# Patient Record
Sex: MALE | Race: WHITE | NOT HISPANIC OR LATINO | Employment: OTHER
[De-identification: names, ages, dates, MRNs, and addresses within clinical notes are randomized per-mention and may not be internally consistent; named-entity substitution may affect disease eponyms.]

---

## 2020-02-23 ENCOUNTER — HEALTH MAINTENANCE LETTER (OUTPATIENT)
Age: 70
End: 2020-02-23

## 2020-12-06 ENCOUNTER — HEALTH MAINTENANCE LETTER (OUTPATIENT)
Age: 70
End: 2020-12-06

## 2021-04-11 ENCOUNTER — HEALTH MAINTENANCE LETTER (OUTPATIENT)
Age: 71
End: 2021-04-11

## 2021-09-25 ENCOUNTER — HEALTH MAINTENANCE LETTER (OUTPATIENT)
Age: 71
End: 2021-09-25

## 2021-11-19 ENCOUNTER — MEDICAL CORRESPONDENCE (OUTPATIENT)
Dept: HEALTH INFORMATION MANAGEMENT | Facility: CLINIC | Age: 71
End: 2021-11-19
Payer: COMMERCIAL

## 2021-11-19 ENCOUNTER — TRANSCRIBE ORDERS (OUTPATIENT)
Dept: OTHER | Age: 71
End: 2021-11-19
Payer: COMMERCIAL

## 2021-11-19 DIAGNOSIS — E04.2 MULTINODULAR THYROID: Primary | ICD-10-CM

## 2021-11-22 ENCOUNTER — TELEPHONE (OUTPATIENT)
Dept: OTOLARYNGOLOGY | Facility: CLINIC | Age: 71
End: 2021-11-22
Payer: COMMERCIAL

## 2021-11-22 NOTE — TELEPHONE ENCOUNTER
FUTURE VISIT INFORMATION      FUTURE VISIT INFORMATION:    Date: 12/13/21    Time: 3 PM    Location: [facility]  REFERRAL INFORMATION:    Referring provider: Dr. Sandie Sanchez    Referring providers clinic: [facility]    Reason for visit/diagnosis: Multinodular Thyroid    RECORDS REQUESTED FROM:       Clinic name Comments Records Status Imaging Status   [facility] 11/19/21 - ENDO OV with Dr. Sanchez Care Everywhere    [facility] [place] 4/10/17 - ENDO OV with Dr. Aiken Care Everywhere    [facility] - [facility] 3/27/19 - TSH Care Everywhere    [facility] - [facility] 4/10/17 - US Thyroid/Parathyroid Care Everywhere 11/22 Req - In PACs                       * 11/22/21 9:59 AM Faxed req to [facility] for images to be pushed to [facility]. - Rochelle

## 2021-11-22 NOTE — TELEPHONE ENCOUNTER
LVM to schedule with next available head and neck (Yony Batista, Cristy) or Phillip    Visit type: P NEW    [facility] call center number

## 2021-12-13 ENCOUNTER — OFFICE VISIT (OUTPATIENT)
Dept: OTOLARYNGOLOGY | Facility: CLINIC | Age: 71
End: 2021-12-13
Payer: COMMERCIAL

## 2021-12-13 ENCOUNTER — PRE VISIT (OUTPATIENT)
Dept: OTOLARYNGOLOGY | Facility: CLINIC | Age: 71
End: 2021-12-13

## 2021-12-13 ENCOUNTER — ANCILLARY PROCEDURE (OUTPATIENT)
Dept: CT IMAGING | Facility: CLINIC | Age: 71
End: 2021-12-13
Attending: SURGERY
Payer: COMMERCIAL

## 2021-12-13 ENCOUNTER — PREP FOR PROCEDURE (OUTPATIENT)
Dept: OTOLARYNGOLOGY | Facility: CLINIC | Age: 71
End: 2021-12-13

## 2021-12-13 VITALS
WEIGHT: 275 LBS | HEIGHT: 75 IN | OXYGEN SATURATION: 100 % | DIASTOLIC BLOOD PRESSURE: 70 MMHG | SYSTOLIC BLOOD PRESSURE: 139 MMHG | BODY MASS INDEX: 34.19 KG/M2 | HEART RATE: 59 BPM | TEMPERATURE: 98 F

## 2021-12-13 DIAGNOSIS — E04.9 SUBSTERNAL THYROID GOITER: Primary | ICD-10-CM

## 2021-12-13 DIAGNOSIS — E04.9 SUBSTERNAL THYROID GOITER: ICD-10-CM

## 2021-12-13 PROCEDURE — 99203 OFFICE O/P NEW LOW 30 MIN: CPT | Performed by: SURGERY

## 2021-12-13 PROCEDURE — 70491 CT SOFT TISSUE NECK W/DYE: CPT | Mod: GC | Performed by: RADIOLOGY

## 2021-12-13 RX ORDER — DEXAMETHASONE SODIUM PHOSPHATE 4 MG/ML
10 INJECTION, SOLUTION INTRA-ARTICULAR; INTRALESIONAL; INTRAMUSCULAR; INTRAVENOUS; SOFT TISSUE ONCE
Status: CANCELLED | OUTPATIENT
Start: 2021-12-13 | End: 2021-12-13

## 2021-12-13 RX ORDER — IOPAMIDOL 755 MG/ML
100 INJECTION, SOLUTION INTRAVASCULAR ONCE
Status: COMPLETED | OUTPATIENT
Start: 2021-12-13 | End: 2021-12-13

## 2021-12-13 RX ORDER — PREDNISONE 20 MG/1
TABLET ORAL
COMMUNITY
Start: 2021-08-10

## 2021-12-13 RX ADMIN — IOPAMIDOL 100 ML: 755 INJECTION, SOLUTION INTRAVASCULAR at 18:52

## 2021-12-13 ASSESSMENT — PAIN SCALES - GENERAL: PAINLEVEL: NO PAIN (0)

## 2021-12-13 ASSESSMENT — MIFFLIN-ST. JEOR: SCORE: 2093.02

## 2021-12-13 NOTE — PROGRESS NOTES
Surgery Consult Note  Staff: Dr. Fisher  Date: 12/15/2021   Consulted for: Multinodular goiter    Assessment/Plan:  71 year old male with CML on imatinib who presents for evaluation of known multinodular goiter. He would like surgical removal due to the increasing size.    - CT Chest for surgical planning  - Plan for right thyroid lobectomy, possible total thyroidectomy    Seen and discussed with Dr. Phillip Kwong MD  General Surgery PGY-7    ------------------------------------------  HPI:   Naman Barron is a 71 year old male with CML on imatinib who presents for evaluation of known goiter. He has been monitoring this for at least 4 years. It has slowly been growing. He doesn't notice any symptoms from the goiter, but his wife notes that his voice seems a bit deeper and raspy since the spring. She also states that he is more short of breath when going up stairs in the last month. Denies hyper or hypothyroid symptoms. No prior radiation to the neck/chest and no personal/family history of thyroid cancer. Does not smoke tobacco. Rarely drinks alcohol. No steroid medication or blood thinners. He has tried treating the goiter with levothyroxine, but that did not help.  ?  PMH:  Past Medical History:   Diagnosis Date     Cancer (H) 2007    CML     Sensorineural hearing loss 1971    Mid-range, Hearing Aids 2020     Sleep apnea 2007    CPAP        PSHx:  No prior neck surgery     Medications:  cholecalciferol (VITAMIN D3) 1000 UNIT tablet, Take by mouth daily 4000 units daily  imatinib (GLEEVEC) 400 MG tablet, Take 400 mg by mouth daily  phosphorus tablet 250 mg (PHOSPHORUS TABLET 250 MG) 250 MG per tablet, Phospha 250 Neutral 250 mg tablet  Potassium Phosphate Monobasic (K-PHOS PO), 250 mg 2 times daily  predniSONE (DELTASONE) 20 MG tablet,   vitamin E 400 UNIT capsule, Take by mouth daily 2 tabs daily    Allergies:   Wasps [hornets]     SocHx:  Social History     Socioeconomic History     Marital  "status:      Spouse name: Not on file     Number of children: Not on file     Years of education: Not on file     Highest education level: Not on file   Occupational History     Not on file   Tobacco Use     Smoking status: Never Smoker     Smokeless tobacco: Never Used   Substance and Sexual Activity     Alcohol use: Yes     Comment: Occasional     Drug use: Never     Sexual activity: Yes     Partners: Female     Birth control/protection: Post-menopausal   Other Topics Concern     Not on file   Social History Narrative     Not on file     Social Determinants of Health     Financial Resource Strain: Not on file   Food Insecurity: Not on file   Transportation Needs: Not on file   Physical Activity: Not on file   Stress: Not on file   Social Connections: Not on file   Intimate Partner Violence: Not on file   Housing Stability: Not on file       FamHx:  Negative for bleeding disorders, clotting disorders, or problems with anesthesia    Review of Systems:  ROS: 10 point ROS neg other than the symptoms noted above in the HPI.    Physical Examination   /70   Pulse 59   Temp 98  F (36.7  C)   Ht 1.905 m (6' 3\")   Wt 124.7 kg (275 lb)   SpO2 100%   BMI 34.37 kg/m    General: Awake and alert. NAD  HEENT: visible goiter R>L; larynx deviated to patient's left side; thyroid moves freely with swallowing; no cervical, submental or supra/infra clavicular lymphadenopathy  Pulm: Non labored breathing, no tachypnea   CV: RRR  Abdomen: soft, non-distended  Musculoskel/Extremities: no edema, erythema, tenderness   Skin: no rashes, no diaphoresis and skin color normal     Labs: Reviewed  FNA 12/7/21   A) THYROID, RIGHT LOBE, ULTRASOUND GUIDED FINE NEEDLE ASPIRATION:   1. Cystic thyroid nodule, technically nondiagnostic       B) THYROID, LEFT LOBE, ULTRASOUND GUIDED FINE NEEDLE ASPIRATION:   1. Benign colloid nodule   2. Negative for malignancy        Imaging: Reviewed  U/S thyroid/parathyroid 12/15/21  1.  Multiple " stable bilateral thyroid nodules.   2.  New versus better defined 3.6 cm inferior left thyroid nodule.      Pertinent parts of the the patient's medical history reviewed and confirmed by the provider included : Multinodular goiter    I personally reviewed the EMR, radiographic images, and laboratory data and examined the patient. I agree with the above note    Total time >30 minutes minutes     Sandie Fisher MD  ===================================================    I have reviewed the note as documented above.  This accurately captures the substance of my conversation with the patient,        Assessment/Plan:  Multinodular goiter  Substernal goiter    I recommend total thyroidectomy. This will be a complex case-patient will spend the night in the hospital and have a drain in place.    Reviewed the surgery and procedure including, but not limited to bleeding, infection, injury to the RLN(s), loss of airway, permanent hypocalcemia. Patient would like to proceed with surgery.    Sandie Fisher MD

## 2021-12-13 NOTE — NURSING NOTE
"Chief Complaint   Patient presents with     Consult     multinodal thyroid      Blood pressure 139/70, pulse 59, temperature 98  F (36.7  C), height 1.905 m (6' 3\"), weight 124.7 kg (275 lb), SpO2 100 %.    Henry Mei LPN    "

## 2021-12-13 NOTE — PATIENT INSTRUCTIONS
1. Please schedule CT scan  2. Please call the ENT clinic with any questions,concerns, new or worsening symptoms.    -Clinic number is [phone]   - [facility] direct line (Dr. Fisher's nurse) [phone]

## 2021-12-13 NOTE — LETTER
12/13/2021       RE: Naman Barron  [address] [address] [place] [id number]     Dear Colleague,    Thank you for referring your patient, Naman Barron, to the [facility] [place] [facility]. Please see a copy of my visit note below.    Surgery Consult Note  Staff: Dr. Fisher  Date: 12/15/2021   Consulted for: Multinodular goiter    Assessment/Plan:  71 year old male with CML on imatinib who presents for evaluation of known multinodular goiter. He would like surgical removal due to the increasing size.    - CT Chest for surgical planning  - Plan for right thyroid lobectomy, possible total thyroidectomy    Seen and discussed with Dr. Phillip Kwong MD  General Surgery PGY-7    ------------------------------------------  HPI:   Naman Barron is a 71 year old male with CML on imatinib who presents for evaluation of known goiter. He has been monitoring this for at least 4 years. It has slowly been growing. He doesn't notice any symptoms from the goiter, but his wife notes that his voice seems a bit deeper and raspy since the spring. She also states that he is more short of breath when going up stairs in the last month. Denies hyper or hypothyroid symptoms. No prior radiation to the neck/chest and no personal/family history of thyroid cancer. Does not smoke tobacco. Rarely drinks alcohol. No steroid medication or blood thinners. He has tried treating the goiter with levothyroxine, but that did not help.  ?  PMH:  Past Medical History:   Diagnosis Date     Cancer (H) 2007    CML     Sensorineural hearing loss 1971    Mid-range, Hearing Aids 2020     Sleep apnea 2007    CPAP        PSHx:  No prior neck surgery     Medications:  cholecalciferol (VITAMIN D3) 1000 UNIT tablet, Take by mouth daily 4000 units daily  imatinib (GLEEVEC) 400 MG tablet, Take 400 mg by mouth daily  phosphorus tablet 250 mg (PHOSPHORUS TABLET  "250 MG) 250 MG per tablet, Phospha 250 Neutral 250 mg tablet  Potassium Phosphate Monobasic (K-PHOS PO), 250 mg 2 times daily  predniSONE (DELTASONE) 20 MG tablet,   vitamin E 400 UNIT capsule, Take by mouth daily 2 tabs daily    Allergies:   Wasps [hornets]     SocHx:  Social History     Socioeconomic History     Marital status:      Spouse name: Not on file     Number of children: Not on file     Years of education: Not on file     Highest education level: Not on file   Occupational History     Not on file   Tobacco Use     Smoking status: Never Smoker     Smokeless tobacco: Never Used   Substance and Sexual Activity     Alcohol use: Yes     Comment: Occasional     Drug use: Never     Sexual activity: Yes     Partners: Female     Birth control/protection: Post-menopausal   Other Topics Concern     Not on file   Social History Narrative     Not on file     Social Determinants of Health     Financial Resource Strain: Not on file   Food Insecurity: Not on file   Transportation Needs: Not on file   Physical Activity: Not on file   Stress: Not on file   Social Connections: Not on file   Intimate Partner Violence: Not on file   Housing Stability: Not on file       FamHx:  Negative for bleeding disorders, clotting disorders, or problems with anesthesia    Review of Systems:  ROS: 10 point ROS neg other than the symptoms noted above in the HPI.    Physical Examination   /70   Pulse 59   Temp 98  F (36.7  C)   Ht 1.905 m (6' 3\")   Wt 124.7 kg (275 lb)   SpO2 100%   BMI 34.37 kg/m    General: Awake and alert. NAD  HEENT: visible goiter R>L; larynx deviated to patient's left side; thyroid moves freely with swallowing; no cervical, submental or supra/infra clavicular lymphadenopathy  Pulm: Non labored breathing, no tachypnea   CV: RRR  Abdomen: soft, non-distended  Musculoskel/Extremities: no edema, erythema, tenderness   Skin: no rashes, no diaphoresis and skin color normal     Labs: Reviewed  FNA " 12/7/21   A) THYROID, RIGHT LOBE, ULTRASOUND GUIDED FINE NEEDLE ASPIRATION:   1. Cystic thyroid nodule, technically nondiagnostic       B) THYROID, LEFT LOBE, ULTRASOUND GUIDED FINE NEEDLE ASPIRATION:   1. Benign colloid nodule   2. Negative for malignancy        Imaging: Reviewed  U/S thyroid/parathyroid 12/15/21  1.  Multiple stable bilateral thyroid nodules.   2.  New versus better defined 3.6 cm inferior left thyroid nodule.      Pertinent parts of the the patient's medical history reviewed and confirmed by the provider included : Multinodular goiter    I personally reviewed the EMR, radiographic images, and laboratory data and examined the patient. I agree with the above note    Total time >30 minutes minutes     Sandie Fisher MD  ===================================================    I have reviewed the note as documented above.  This accurately captures the substance of my conversation with the patient,        Assessment/Plan:  Multinodular goiter  Substernal goiter    I recommend total thyroidectomy. This will be a complex case-patient will spend the night in the hospital and have a drain in place.    Reviewed the surgery and procedure including, but not limited to bleeding, infection, injury to the RLN(s), loss of airway, permanent hypocalcemia. Patient would like to proceed with surgery.    Sandie Fisher MD

## 2021-12-27 ENCOUNTER — TELEPHONE (OUTPATIENT)
Dept: OTOLARYNGOLOGY | Facility: CLINIC | Age: 71
End: 2021-12-27
Payer: COMMERCIAL

## 2021-12-27 NOTE — TELEPHONE ENCOUNTER
"Called patient to schedule surgery with Dr. Dutton. Patient asked \"can we postpone this?\" informed patient that writer can call him back at a later date. Pt asked if writer can call next week [place]. Informed patient that writer will give him a call at a later date.     Kathryn Ornelas on 12/27/2021 at 9:57 AM    "

## 2022-01-05 NOTE — TELEPHONE ENCOUNTER
Called patient to schedule surgery with Dr. Fisher. Patient states he will be out of the state in [place] until 3/31/2022 wanting to schedule after that.     Patient scheduled for 4/20/2022. Knows pre-op with PCP within 30 days and covid-19 test within 4 days of surgery.     Location of surgery: [place] OR - patient states he would like this to be scheduled in [place]. Informed patient that Dr. Fisher doesn't do all surgeries in . And this would require at the [facility]     Pre-Op H&P:  [facility]   Post-Op Appt Date:  2 weeks  Imaging needed:  Yes - CT completed  Scheduled/Discussed COVID-19 Testing: Yes    Authorization Completed (Before Scheduling)  No    Patient aware that pre-op RN will call 2-3 days prior to surgery with arrival time and instructions Yes     Packet sent out: Yes 01/05/22    All patients questions were answered and was instructed to review surgical packet and call back with any questions or concerns.     Kathryn Ornelas on 1/5/2022 at 3:37 PM

## 2022-03-09 DIAGNOSIS — Z11.59 ENCOUNTER FOR SCREENING FOR OTHER VIRAL DISEASES: Primary | ICD-10-CM

## 2022-04-19 ENCOUNTER — ANESTHESIA EVENT (OUTPATIENT)
Dept: SURGERY | Facility: CLINIC | Age: 72
DRG: 626 | End: 2022-04-19
Payer: COMMERCIAL

## 2022-04-20 ENCOUNTER — ANESTHESIA (OUTPATIENT)
Dept: SURGERY | Facility: CLINIC | Age: 72
DRG: 626 | End: 2022-04-20
Payer: COMMERCIAL

## 2022-04-20 ENCOUNTER — HOSPITAL ENCOUNTER (INPATIENT)
Facility: CLINIC | Age: 72
LOS: 1 days | Discharge: HOME | DRG: 626 | End: 2022-04-21
Attending: SURGERY | Admitting: SURGERY
Payer: COMMERCIAL

## 2022-04-20 DIAGNOSIS — E89.0 S/P TOTAL THYROIDECTOMY: Primary | ICD-10-CM

## 2022-04-20 LAB
CA-I BLD-MCNC: 4.5 MG/DL (ref 4.4–5.2)
CALCIUM SERPL-MCNC: 8.4 MG/DL (ref 8.5–10.1)
GLUCOSE BLDC GLUCOMTR-MCNC: 113 MG/DL (ref 70–99)
PTH-INTACT SERPL-MCNC: 30 PG/ML (ref 18–80)

## 2022-04-20 PROCEDURE — 370N000017 HC ANESTHESIA TECHNICAL FEE, PER MIN: Performed by: SURGERY

## 2022-04-20 PROCEDURE — 710N000010 HC RECOVERY PHASE 1, LEVEL 2, PER MIN: Performed by: SURGERY

## 2022-04-20 PROCEDURE — 250N000011 HC RX IP 250 OP 636: Performed by: ANESTHESIOLOGY

## 2022-04-20 PROCEDURE — 999N000141 HC STATISTIC PRE-PROCEDURE NURSING ASSESSMENT: Performed by: SURGERY

## 2022-04-20 PROCEDURE — 250N000009 HC RX 250: Performed by: NURSE ANESTHETIST, CERTIFIED REGISTERED

## 2022-04-20 PROCEDURE — 82310 ASSAY OF CALCIUM: CPT

## 2022-04-20 PROCEDURE — 250N000025 HC SEVOFLURANE, PER MIN: Performed by: SURGERY

## 2022-04-20 PROCEDURE — 250N000013 HC RX MED GY IP 250 OP 250 PS 637: Performed by: STUDENT IN AN ORGANIZED HEALTH CARE EDUCATION/TRAINING PROGRAM

## 2022-04-20 PROCEDURE — 5A09357 ASSISTANCE WITH RESPIRATORY VENTILATION, LESS THAN 24 CONSECUTIVE HOURS, CONTINUOUS POSITIVE AIRWAY PRESSURE: ICD-10-PCS | Performed by: SURGERY

## 2022-04-20 PROCEDURE — 258N000003 HC RX IP 258 OP 636: Performed by: SURGERY

## 2022-04-20 PROCEDURE — 360N000077 HC SURGERY LEVEL 4, PER MIN: Performed by: SURGERY

## 2022-04-20 PROCEDURE — 250N000011 HC RX IP 250 OP 636: Performed by: SURGERY

## 2022-04-20 PROCEDURE — 83970 ASSAY OF PARATHORMONE: CPT | Performed by: SURGERY

## 2022-04-20 PROCEDURE — G0378 HOSPITAL OBSERVATION PER HR: HCPCS

## 2022-04-20 PROCEDURE — 36415 COLL VENOUS BLD VENIPUNCTURE: CPT

## 2022-04-20 PROCEDURE — 60271 REMOVAL OF THYROID: CPT | Mod: GC | Performed by: SURGERY

## 2022-04-20 PROCEDURE — 250N000009 HC RX 250: Performed by: SURGERY

## 2022-04-20 PROCEDURE — 88307 TISSUE EXAM BY PATHOLOGIST: CPT | Mod: TC | Performed by: SURGERY

## 2022-04-20 PROCEDURE — 272N000001 HC OR GENERAL SUPPLY STERILE: Performed by: SURGERY

## 2022-04-20 PROCEDURE — 258N000003 HC RX IP 258 OP 636: Performed by: NURSE ANESTHETIST, CERTIFIED REGISTERED

## 2022-04-20 PROCEDURE — 120N000002 HC R&B MED SURG/OB UMMC

## 2022-04-20 PROCEDURE — 250N000011 HC RX IP 250 OP 636: Performed by: NURSE ANESTHETIST, CERTIFIED REGISTERED

## 2022-04-20 PROCEDURE — 0GTK0ZZ RESECTION OF THYROID GLAND, OPEN APPROACH: ICD-10-PCS | Performed by: SURGERY

## 2022-04-20 PROCEDURE — 82330 ASSAY OF CALCIUM: CPT

## 2022-04-20 PROCEDURE — 36415 COLL VENOUS BLD VENIPUNCTURE: CPT | Performed by: SURGERY

## 2022-04-20 RX ORDER — HYDROMORPHONE HCL IN WATER/PF 6 MG/30 ML
0.4 PATIENT CONTROLLED ANALGESIA SYRINGE INTRAVENOUS
Status: DISCONTINUED | OUTPATIENT
Start: 2022-04-20 | End: 2022-04-21 | Stop reason: HOSPADM

## 2022-04-20 RX ORDER — NALOXONE HYDROCHLORIDE 0.4 MG/ML
0.2 INJECTION, SOLUTION INTRAMUSCULAR; INTRAVENOUS; SUBCUTANEOUS
Status: DISCONTINUED | OUTPATIENT
Start: 2022-04-20 | End: 2022-04-21 | Stop reason: HOSPADM

## 2022-04-20 RX ORDER — LIDOCAINE 40 MG/G
CREAM TOPICAL
Status: DISCONTINUED | OUTPATIENT
Start: 2022-04-20 | End: 2022-04-20 | Stop reason: HOSPADM

## 2022-04-20 RX ORDER — LIDOCAINE HYDROCHLORIDE 20 MG/ML
INJECTION, SOLUTION INFILTRATION; PERINEURAL PRN
Status: DISCONTINUED | OUTPATIENT
Start: 2022-04-20 | End: 2022-04-20

## 2022-04-20 RX ORDER — NALOXONE HYDROCHLORIDE 0.4 MG/ML
0.2 INJECTION, SOLUTION INTRAMUSCULAR; INTRAVENOUS; SUBCUTANEOUS
Status: DISCONTINUED | OUTPATIENT
Start: 2022-04-20 | End: 2022-04-20 | Stop reason: HOSPADM

## 2022-04-20 RX ORDER — SODIUM CHLORIDE, SODIUM LACTATE, POTASSIUM CHLORIDE, CALCIUM CHLORIDE 600; 310; 30; 20 MG/100ML; MG/100ML; MG/100ML; MG/100ML
INJECTION, SOLUTION INTRAVENOUS CONTINUOUS
Status: DISCONTINUED | OUTPATIENT
Start: 2022-04-20 | End: 2022-04-20 | Stop reason: HOSPADM

## 2022-04-20 RX ORDER — GLYCOPYRROLATE 0.2 MG/ML
INJECTION, SOLUTION INTRAMUSCULAR; INTRAVENOUS PRN
Status: DISCONTINUED | OUTPATIENT
Start: 2022-04-20 | End: 2022-04-20

## 2022-04-20 RX ORDER — HYDROMORPHONE HYDROCHLORIDE 1 MG/ML
0.2 INJECTION, SOLUTION INTRAMUSCULAR; INTRAVENOUS; SUBCUTANEOUS EVERY 5 MIN PRN
Status: DISCONTINUED | OUTPATIENT
Start: 2022-04-20 | End: 2022-04-20 | Stop reason: HOSPADM

## 2022-04-20 RX ORDER — LIDOCAINE 40 MG/G
CREAM TOPICAL
Status: DISCONTINUED | OUTPATIENT
Start: 2022-04-20 | End: 2022-04-21 | Stop reason: HOSPADM

## 2022-04-20 RX ORDER — EPHEDRINE SULFATE 50 MG/ML
INJECTION, SOLUTION INTRAMUSCULAR; INTRAVENOUS; SUBCUTANEOUS PRN
Status: DISCONTINUED | OUTPATIENT
Start: 2022-04-20 | End: 2022-04-20

## 2022-04-20 RX ORDER — OXYCODONE HYDROCHLORIDE 10 MG/1
10 TABLET ORAL EVERY 4 HOURS PRN
Status: DISCONTINUED | OUTPATIENT
Start: 2022-04-20 | End: 2022-04-21 | Stop reason: HOSPADM

## 2022-04-20 RX ORDER — DEXAMETHASONE SODIUM PHOSPHATE 10 MG/ML
10 INJECTION, SOLUTION INTRAMUSCULAR; INTRAVENOUS ONCE
Status: COMPLETED | OUTPATIENT
Start: 2022-04-20 | End: 2022-04-20

## 2022-04-20 RX ORDER — NALOXONE HYDROCHLORIDE 0.4 MG/ML
0.4 INJECTION, SOLUTION INTRAMUSCULAR; INTRAVENOUS; SUBCUTANEOUS
Status: DISCONTINUED | OUTPATIENT
Start: 2022-04-20 | End: 2022-04-20 | Stop reason: HOSPADM

## 2022-04-20 RX ORDER — MEPERIDINE HYDROCHLORIDE 25 MG/ML
12.5 INJECTION INTRAMUSCULAR; INTRAVENOUS; SUBCUTANEOUS
Status: DISCONTINUED | OUTPATIENT
Start: 2022-04-20 | End: 2022-04-20 | Stop reason: HOSPADM

## 2022-04-20 RX ORDER — NALOXONE HYDROCHLORIDE 0.4 MG/ML
0.4 INJECTION, SOLUTION INTRAMUSCULAR; INTRAVENOUS; SUBCUTANEOUS
Status: DISCONTINUED | OUTPATIENT
Start: 2022-04-20 | End: 2022-04-21 | Stop reason: HOSPADM

## 2022-04-20 RX ORDER — PROPOFOL 10 MG/ML
INJECTION, EMULSION INTRAVENOUS PRN
Status: DISCONTINUED | OUTPATIENT
Start: 2022-04-20 | End: 2022-04-20

## 2022-04-20 RX ORDER — ACETAMINOPHEN 325 MG/1
975 TABLET ORAL 3 TIMES DAILY
Status: DISCONTINUED | OUTPATIENT
Start: 2022-04-20 | End: 2022-04-21 | Stop reason: HOSPADM

## 2022-04-20 RX ORDER — IMATINIB MESYLATE 400 MG/1
400 TABLET, FILM COATED ORAL
Status: DISCONTINUED | OUTPATIENT
Start: 2022-04-21 | End: 2022-04-21 | Stop reason: HOSPADM

## 2022-04-20 RX ORDER — FENTANYL CITRATE 50 UG/ML
INJECTION, SOLUTION INTRAMUSCULAR; INTRAVENOUS PRN
Status: DISCONTINUED | OUTPATIENT
Start: 2022-04-20 | End: 2022-04-20

## 2022-04-20 RX ORDER — PROCHLORPERAZINE MALEATE 5 MG
5 TABLET ORAL EVERY 6 HOURS PRN
Status: DISCONTINUED | OUTPATIENT
Start: 2022-04-20 | End: 2022-04-21 | Stop reason: HOSPADM

## 2022-04-20 RX ORDER — ONDANSETRON 2 MG/ML
4 INJECTION INTRAMUSCULAR; INTRAVENOUS EVERY 6 HOURS PRN
Status: DISCONTINUED | OUTPATIENT
Start: 2022-04-20 | End: 2022-04-21 | Stop reason: HOSPADM

## 2022-04-20 RX ORDER — OXYCODONE HYDROCHLORIDE 5 MG/1
5 TABLET ORAL EVERY 4 HOURS PRN
Status: DISCONTINUED | OUTPATIENT
Start: 2022-04-20 | End: 2022-04-20 | Stop reason: HOSPADM

## 2022-04-20 RX ORDER — CALCIUM CARBONATE 500 MG/1
1000 TABLET, CHEWABLE ORAL 3 TIMES DAILY
Status: DISCONTINUED | OUTPATIENT
Start: 2022-04-20 | End: 2022-04-21 | Stop reason: HOSPADM

## 2022-04-20 RX ORDER — HYDROMORPHONE HCL IN WATER/PF 6 MG/30 ML
0.2 PATIENT CONTROLLED ANALGESIA SYRINGE INTRAVENOUS
Status: DISCONTINUED | OUTPATIENT
Start: 2022-04-20 | End: 2022-04-21 | Stop reason: HOSPADM

## 2022-04-20 RX ORDER — IMATINIB MESYLATE 400 MG/1
400 TABLET, FILM COATED ORAL DAILY
Status: DISCONTINUED | OUTPATIENT
Start: 2022-04-21 | End: 2022-04-20

## 2022-04-20 RX ORDER — CALCITRIOL 0.25 UG/1
0.25 CAPSULE, LIQUID FILLED ORAL 3 TIMES DAILY
Status: DISCONTINUED | OUTPATIENT
Start: 2022-04-20 | End: 2022-04-21 | Stop reason: HOSPADM

## 2022-04-20 RX ORDER — FENTANYL CITRATE 50 UG/ML
25 INJECTION, SOLUTION INTRAMUSCULAR; INTRAVENOUS
Status: DISCONTINUED | OUTPATIENT
Start: 2022-04-20 | End: 2022-04-20 | Stop reason: HOSPADM

## 2022-04-20 RX ORDER — ONDANSETRON 2 MG/ML
4 INJECTION INTRAMUSCULAR; INTRAVENOUS EVERY 30 MIN PRN
Status: DISCONTINUED | OUTPATIENT
Start: 2022-04-20 | End: 2022-04-20 | Stop reason: HOSPADM

## 2022-04-20 RX ORDER — OXYCODONE HYDROCHLORIDE 5 MG/1
5 TABLET ORAL EVERY 4 HOURS PRN
Status: DISCONTINUED | OUTPATIENT
Start: 2022-04-20 | End: 2022-04-21 | Stop reason: HOSPADM

## 2022-04-20 RX ORDER — ONDANSETRON 4 MG/1
4 TABLET, ORALLY DISINTEGRATING ORAL EVERY 6 HOURS PRN
Status: DISCONTINUED | OUTPATIENT
Start: 2022-04-20 | End: 2022-04-21 | Stop reason: HOSPADM

## 2022-04-20 RX ORDER — FENTANYL CITRATE 50 UG/ML
25 INJECTION, SOLUTION INTRAMUSCULAR; INTRAVENOUS EVERY 5 MIN PRN
Status: DISCONTINUED | OUTPATIENT
Start: 2022-04-20 | End: 2022-04-20 | Stop reason: HOSPADM

## 2022-04-20 RX ORDER — ONDANSETRON 4 MG/1
4 TABLET, ORALLY DISINTEGRATING ORAL EVERY 30 MIN PRN
Status: DISCONTINUED | OUTPATIENT
Start: 2022-04-20 | End: 2022-04-20 | Stop reason: HOSPADM

## 2022-04-20 RX ORDER — ONDANSETRON 2 MG/ML
INJECTION INTRAMUSCULAR; INTRAVENOUS PRN
Status: DISCONTINUED | OUTPATIENT
Start: 2022-04-20 | End: 2022-04-20

## 2022-04-20 RX ORDER — LEVOTHYROXINE SODIUM 150 UG/1
150 TABLET ORAL DAILY
Status: DISCONTINUED | OUTPATIENT
Start: 2022-04-20 | End: 2022-04-21 | Stop reason: HOSPADM

## 2022-04-20 RX ORDER — PHENYLEPHRINE HCL IN 0.9% NACL 50MG/250ML
.1-1 PLASTIC BAG, INJECTION (ML) INTRAVENOUS CONTINUOUS
Status: DISCONTINUED | OUTPATIENT
Start: 2022-04-20 | End: 2022-04-20 | Stop reason: HOSPADM

## 2022-04-20 RX ORDER — SODIUM CHLORIDE, SODIUM LACTATE, POTASSIUM CHLORIDE, CALCIUM CHLORIDE 600; 310; 30; 20 MG/100ML; MG/100ML; MG/100ML; MG/100ML
INJECTION, SOLUTION INTRAVENOUS CONTINUOUS PRN
Status: DISCONTINUED | OUTPATIENT
Start: 2022-04-20 | End: 2022-04-20

## 2022-04-20 RX ADMIN — PHENYLEPHRINE HYDROCHLORIDE 100 MCG: 10 INJECTION INTRAVENOUS at 09:16

## 2022-04-20 RX ADMIN — Medication 0.5 MCG/KG/MIN: at 10:23

## 2022-04-20 RX ADMIN — CALCITRIOL CAPSULES 0.25 MCG 0.25 MCG: 0.25 CAPSULE ORAL at 23:14

## 2022-04-20 RX ADMIN — FENTANYL CITRATE 25 MCG: 50 INJECTION, SOLUTION INTRAMUSCULAR; INTRAVENOUS at 12:50

## 2022-04-20 RX ADMIN — Medication 5 MG: at 08:33

## 2022-04-20 RX ADMIN — LIDOCAINE HYDROCHLORIDE 60 MG: 20 INJECTION, SOLUTION INFILTRATION; PERINEURAL at 08:15

## 2022-04-20 RX ADMIN — PHENYLEPHRINE HYDROCHLORIDE 200 MCG: 10 INJECTION INTRAVENOUS at 10:05

## 2022-04-20 RX ADMIN — ANTACID TABLETS 1000 MG: 500 TABLET, CHEWABLE ORAL at 23:14

## 2022-04-20 RX ADMIN — REMIFENTANIL HYDROCHLORIDE 0.1 MCG/KG/MIN: 1 INJECTION, POWDER, LYOPHILIZED, FOR SOLUTION INTRAVENOUS at 10:41

## 2022-04-20 RX ADMIN — PHENYLEPHRINE HYDROCHLORIDE 100 MCG: 10 INJECTION INTRAVENOUS at 11:44

## 2022-04-20 RX ADMIN — HYDROMORPHONE HYDROCHLORIDE 0.2 MG: 1 INJECTION, SOLUTION INTRAMUSCULAR; INTRAVENOUS; SUBCUTANEOUS at 13:49

## 2022-04-20 RX ADMIN — GLYCOPYRROLATE 0.2 MG: 0.2 INJECTION, SOLUTION INTRAMUSCULAR; INTRAVENOUS at 08:35

## 2022-04-20 RX ADMIN — ONDANSETRON 4 MG: 2 INJECTION INTRAMUSCULAR; INTRAVENOUS at 11:54

## 2022-04-20 RX ADMIN — Medication 5 MG: at 08:31

## 2022-04-20 RX ADMIN — LIDOCAINE HYDROCHLORIDE 60 MG: 20 INJECTION, SOLUTION INFILTRATION; PERINEURAL at 12:13

## 2022-04-20 RX ADMIN — REMIFENTANIL HYDROCHLORIDE 0.05 MCG/KG/MIN: 1 INJECTION, POWDER, LYOPHILIZED, FOR SOLUTION INTRAVENOUS at 08:16

## 2022-04-20 RX ADMIN — PHENYLEPHRINE HYDROCHLORIDE 100 MCG: 10 INJECTION INTRAVENOUS at 10:17

## 2022-04-20 RX ADMIN — PHENYLEPHRINE HYDROCHLORIDE 100 MCG: 10 INJECTION INTRAVENOUS at 08:55

## 2022-04-20 RX ADMIN — HYDROMORPHONE HYDROCHLORIDE 0.3 MG: 1 INJECTION, SOLUTION INTRAMUSCULAR; INTRAVENOUS; SUBCUTANEOUS at 11:44

## 2022-04-20 RX ADMIN — SODIUM CHLORIDE, POTASSIUM CHLORIDE, SODIUM LACTATE AND CALCIUM CHLORIDE: 600; 310; 30; 20 INJECTION, SOLUTION INTRAVENOUS at 08:00

## 2022-04-20 RX ADMIN — PROPOFOL 180 MG: 10 INJECTION, EMULSION INTRAVENOUS at 08:16

## 2022-04-20 RX ADMIN — Medication 140 MG: at 08:16

## 2022-04-20 RX ADMIN — PHENYLEPHRINE HYDROCHLORIDE 200 MCG: 10 INJECTION INTRAVENOUS at 09:47

## 2022-04-20 RX ADMIN — FENTANYL CITRATE 25 MCG: 50 INJECTION, SOLUTION INTRAMUSCULAR; INTRAVENOUS at 13:08

## 2022-04-20 RX ADMIN — FENTANYL CITRATE 25 MCG: 50 INJECTION, SOLUTION INTRAMUSCULAR; INTRAVENOUS at 13:40

## 2022-04-20 RX ADMIN — ACETAMINOPHEN 975 MG: 325 TABLET ORAL at 14:07

## 2022-04-20 RX ADMIN — FENTANYL CITRATE 50 MCG: 50 INJECTION, SOLUTION INTRAMUSCULAR; INTRAVENOUS at 11:54

## 2022-04-20 RX ADMIN — FENTANYL CITRATE 25 MCG: 50 INJECTION, SOLUTION INTRAMUSCULAR; INTRAVENOUS at 13:35

## 2022-04-20 RX ADMIN — PHENYLEPHRINE HYDROCHLORIDE 200 MCG: 10 INJECTION INTRAVENOUS at 09:36

## 2022-04-20 RX ADMIN — LEVOTHYROXINE SODIUM 150 MCG: 0.15 TABLET ORAL at 18:27

## 2022-04-20 RX ADMIN — PHENYLEPHRINE HYDROCHLORIDE 100 MCG: 10 INJECTION INTRAVENOUS at 08:51

## 2022-04-20 RX ADMIN — HYDROMORPHONE HYDROCHLORIDE 0.2 MG: 1 INJECTION, SOLUTION INTRAMUSCULAR; INTRAVENOUS; SUBCUTANEOUS at 12:13

## 2022-04-20 RX ADMIN — FENTANYL CITRATE 50 MCG: 50 INJECTION, SOLUTION INTRAMUSCULAR; INTRAVENOUS at 08:15

## 2022-04-20 RX ADMIN — PHENYLEPHRINE HYDROCHLORIDE 200 MCG: 10 INJECTION INTRAVENOUS at 09:06

## 2022-04-20 RX ADMIN — ACETAMINOPHEN 975 MG: 325 TABLET ORAL at 19:36

## 2022-04-20 RX ADMIN — Medication 5 MG: at 08:35

## 2022-04-20 RX ADMIN — PROPOFOL 20 MG: 10 INJECTION, EMULSION INTRAVENOUS at 08:24

## 2022-04-20 RX ADMIN — PHENYLEPHRINE HYDROCHLORIDE 100 MCG: 10 INJECTION INTRAVENOUS at 09:23

## 2022-04-20 RX ADMIN — DEXAMETHASONE SODIUM PHOSPHATE 10 MG: 10 INJECTION, SOLUTION INTRAMUSCULAR; INTRAVENOUS at 08:16

## 2022-04-20 RX ADMIN — CALCITRIOL CAPSULES 0.25 MCG 0.25 MCG: 0.25 CAPSULE ORAL at 18:24

## 2022-04-20 ASSESSMENT — ACTIVITIES OF DAILY LIVING (ADL)
ADLS_ACUITY_SCORE: 5
ADLS_ACUITY_SCORE: 6
ADLS_ACUITY_SCORE: 5
ADLS_ACUITY_SCORE: 4
ADLS_ACUITY_SCORE: 5

## 2022-04-20 NOTE — ANESTHESIA PREPROCEDURE EVALUATION
Anesthesia Pre-Procedure Evaluation    Patient: Naman Barron   MRN: [id number] : 1950        Procedure : Procedure(s):  substernal thyroidectomy           Past Medical History:   Diagnosis Date     Cancer (H), chronic     CML     Gastroesophageal reflux disease      Gout      Obese      Sensorineural hearing loss 1971    Mid-range, Hearing Aids      Sleep apnea 2007    CPAP      Past Surgical History:   Procedure Laterality Date     COLONOSCOPY        Allergies   Allergen Reactions     Wasps [Hornets] Difficulty breathing     Throat swells up      Social History     Tobacco Use     Smoking status: Never Smoker     Smokeless tobacco: Never Used   Substance Use Topics     Alcohol use: Yes     Comment: Occasional      Wt Readings from Last 1 Encounters:   22 115.7 kg (255 lb 1.2 oz)        Anesthesia Evaluation            ROS/MED HX  ENT/Pulmonary:     (+) sleep apnea, moderate, uses CPAP,     Neurologic: Comment: sensori neural hearing loss; hearing aids      Cardiovascular: Comment: normal sinus rhythm    Normal stress echo      METS/Exercise Tolerance: >4 METS    Hematologic: Comments: History of CML      Musculoskeletal:       GI/Hepatic:     (+) GERD, Asymptomatic on medication,     Renal/Genitourinary:       Endo:     (+) Obesity,     Psychiatric/Substance Use:       Infectious Disease:       Malignancy:       Other:            Physical Exam    Airway      Comment: Right thyroid lobe enlarged; neck mass deviating trachea    Mallampati: I   TM distance: > 3 FB   Neck ROM: full   Mouth opening: > 3 cm    Respiratory Devices and Support         Dental  no notable dental history         Cardiovascular   cardiovascular exam normal       Rhythm and rate: regular     Pulmonary   pulmonary exam normal                OUTSIDE LABS:  CBC: No results found for: WBC, HGB, HCT, PLT  BMP:   Lab Results   Component Value Date     (H) 2022     COAGS: No results found for: PTT, INR,  FIBR  POC: No results found for: BGM, HCG, HCGS  HEPATIC: No results found for: ALBUMIN, PROTTOTAL, ALT, AST, GGT, ALKPHOS, BILITOTAL, BILIDIRECT, KELLI  OTHER: No results found for: PH, LACT, A1C, RAKESH, PHOS, MAG, LIPASE, AMYLASE, TSH, T4, T3, CRP, SED    Anesthesia Plan    ASA Status:  3   NPO Status:  NPO Appropriate    Anesthesia Type: General.     - Airway: ETT   Induction: Intravenous.      Techniques and Equipment:     - Airway: Video-Laryngoscope     - Lines/Monitors: 2nd IV     Consents    Anesthesia Plan(s) and associated risks, benefits, and realistic alternatives discussed. Questions answered and patient/representative(s) expressed understanding.     - Discussed: Risks, Benefits and Alternatives for BOTH SEDATION and the PROCEDURE were discussed     - Discussed with:  Patient         Postoperative Care    Pain management: IV analgesics, Oral pain medications, Multi-modal analgesia.   PONV prophylaxis: Ondansetron (or other 5HT-3), Promethazine or metoclopramide, Dexamethasone or Solumedrol     Comments:                Alfonso Gill MD

## 2022-04-20 NOTE — ANESTHESIA POSTPROCEDURE EVALUATION
Patient: Naman Barron    Procedure: Procedure(s):  Total thyroidectomy and substernal goiter       Anesthesia Type:  General    Note:  Disposition: Inpatient   Postop Pain Control:            Sign Out: Well controlled pain   PONV: No   Neuro/Psych: Uneventful            Sign Out: Acceptable/Baseline neuro status   Airway/Respiratory: Uneventful            Sign Out: Acceptable/Baseline resp. status   CV/Hemodynamics: Uneventful            Sign Out: Acceptable CV status; No obvious hypovolemia; No obvious fluid overload   Other NRE:    DID A NON-ROUTINE EVENT OCCUR? No           Last vitals:  Vitals Value Taken Time   /71 04/20/22 1219   Temp 36.7  C (98.1  F) 04/20/22 1232   Pulse 93 04/20/22 1237   Resp 11 04/20/22 1237   SpO2 99 % 04/20/22 1237   Vitals shown include unvalidated device data.    Electronically Signed By: Alfonso Gill MD  April 20, 2022  12:39 PM

## 2022-04-20 NOTE — PROGRESS NOTES
Status: POD0 total thyroidectomy with central neck dissection  Vitals: VSS with sl HTN within parameters  Neuros: Intact  IV: PIV SL  Labs/Electrolytes: monitoring ical  Resp/trach: WNL on RA  Diet: Advanced to regular diet this shift  Bowel status: LBM 4/20  : Voiding spontaneously  Skin: Blanchable redness to left buttock. KLAUS to neck with dark red drainage.  Pain: pt reporting pain controlled, increased pain with swallowing  Activity: Up with SBA  Social: Wife came to bedside after surgery, left soon after. Supportive  Plan: Likely discharge in 1-2 days

## 2022-04-20 NOTE — ANESTHESIA CARE TRANSFER NOTE
Patient: Naman Barron    Procedure: Procedure(s):  Total thyroidectomy and substernal goiter       Diagnosis: Substernal thyroid goiter [E04.9]  Diagnosis Additional Information: No value filed.    Anesthesia Type:   General     Note:    Oropharynx: oropharynx clear of all foreign objects and spontaneously breathing  Level of Consciousness: drowsy  Oxygen Supplementation: face mask  Level of Supplemental Oxygen (L/min / FiO2): 8  Independent Airway: airway patency satisfactory and stable  Dentition: dentition unchanged  Vital Signs Stable: post-procedure vital signs reviewed and stable  Report to RN Given: handoff report given  Patient transferred to: PACU    Handoff Report: Identifed the Patient, Identified the Reponsible Provider, Reviewed the pertinent medical history, Discussed the surgical course, Reviewed Intra-OP anesthesia mangement and issues during anesthesia, Set expectations for post-procedure period and Allowed opportunity for questions and acknowledgement of understanding      Vitals:  Vitals Value Taken Time   BP     Temp     Pulse     Resp     SpO2         Electronically Signed By: TRINIDAD Dunlap CRNA  April 20, 2022  12:19 PM

## 2022-04-20 NOTE — PROGRESS NOTES
Arrived from: PACU   Belongings/meds: phone, , clothes, medication, glasses, CPAP  2 RN Skin Assessment Completed by: Lsahon MONIQUE and Cheryl TINOCO    Non-intact findings documented (yes/no/NA): Blanchable redness to left buttock, small cuts on hands from carpet laying, bruising on BLE

## 2022-04-20 NOTE — BRIEF OP NOTE
[facility]    Brief Operative Note    Pre-operative diagnosis: Substernal thyroid goiter [E04.9]  Post-operative diagnosis Same as pre-operative diagnosis    Procedure: Procedure(s):  Total thyroidectomy and substernal goiter  Surgeon: Surgeon(s) and Role:     * Sandie Fisher MD - Primary     * Naman Clay MD - Resident - Assisting  Anesthesia: General   Estimated Blood Loss: 200 mL from 4/20/2022  8:01 AM to 4/20/2022 12:17 PM      Drains: Florin-Musa  Specimens:   ID Type Source Tests Collected by Time Destination   1 : Right lobe of thyroid Tissue Thyroid, Right SURGICAL PATHOLOGY EXAM Sandie Fisher MD 4/20/2022 11:27 AM    2 : Left lobe of thyroid Tissue Thyroid, left SURGICAL PATHOLOGY EXAM Sandie Fisher MD 4/20/2022 11:27 AM      Findings:   None.  Complications: None.  Implants: * No implants in log *

## 2022-04-20 NOTE — ANESTHESIA PROCEDURE NOTES
Airway       Patient location during procedure: OR       Procedure Start/Stop Times: 4/20/2022 8:18 AM  Staff -        Anesthesiologist:  Alfonso Gill MD       CRNA: Negar Galindo APRN CRNA       Other Anesthesia Staff: Yeimy Knott       Performed By: SRNAIndications and Patient Condition       Indications for airway management: chinyere-procedural       Induction type:intravenous       Mask difficulty assessment: 1 - vent by mask    Final Airway Details       Final airway type: endotracheal airway       Successful airway: ETT - single, Oral and NIM  Endotracheal Airway Details        ETT size (mm): 7.0       Cuffed: yes       Cuff volume (mL): 10       Successful intubation technique: video laryngoscopy (used for appropriate placement of NIM ETT)       VL Blade Size: MAC 4       Grade View of Cords: 1 (with VL)       Adjucts: stylet       Position: Right       Measured from: gums/teeth       Secured at (cm): 22       Bite block used: Soft    Post intubation assessment        Placement verified by: capnometry, equal breath sounds and chest rise        Number of attempts at approach: 1       Number of other approaches attempted: 0       Secured with: silk tape       Ease of procedure: easy       Dentition: Intact and Unchanged    Medication(s) Administered   Medication Administration Time: 4/20/2022 8:18 AM

## 2022-04-21 VITALS
TEMPERATURE: 97.6 F | SYSTOLIC BLOOD PRESSURE: 150 MMHG | OXYGEN SATURATION: 94 % | RESPIRATION RATE: 16 BRPM | DIASTOLIC BLOOD PRESSURE: 60 MMHG | BODY MASS INDEX: 31.72 KG/M2 | HEIGHT: 75 IN | WEIGHT: 255.07 LBS | HEART RATE: 88 BPM

## 2022-04-21 LAB — GLUCOSE BLDC GLUCOMTR-MCNC: 110 MG/DL (ref 70–99)

## 2022-04-21 PROCEDURE — 999N000226 HC STATISTIC SLP IP EVAL DEFER

## 2022-04-21 PROCEDURE — 250N000013 HC RX MED GY IP 250 OP 250 PS 637: Performed by: STUDENT IN AN ORGANIZED HEALTH CARE EDUCATION/TRAINING PROGRAM

## 2022-04-21 RX ORDER — CALCIUM CARBONATE 500 MG/1
2 TABLET, CHEWABLE ORAL 3 TIMES DAILY
Qty: 180 TABLET | Refills: 2 | Status: SHIPPED | OUTPATIENT
Start: 2022-04-21

## 2022-04-21 RX ORDER — LEVOTHYROXINE SODIUM 150 UG/1
150 TABLET ORAL DAILY
Qty: 30 TABLET | Refills: 1 | Status: SHIPPED | OUTPATIENT
Start: 2022-04-21

## 2022-04-21 RX ORDER — OXYCODONE HYDROCHLORIDE 5 MG/1
5 TABLET ORAL EVERY 6 HOURS PRN
Qty: 12 TABLET | Refills: 0 | Status: SHIPPED | OUTPATIENT
Start: 2022-04-21 | End: 2022-04-24

## 2022-04-21 RX ORDER — CALCITRIOL 0.25 UG/1
0.25 CAPSULE, LIQUID FILLED ORAL 3 TIMES DAILY
Qty: 90 CAPSULE | Refills: 1 | Status: SHIPPED | OUTPATIENT
Start: 2022-04-21

## 2022-04-21 RX ADMIN — CALCITRIOL CAPSULES 0.25 MCG 0.25 MCG: 0.25 CAPSULE ORAL at 08:22

## 2022-04-21 RX ADMIN — LEVOTHYROXINE SODIUM 150 MCG: 0.15 TABLET ORAL at 08:22

## 2022-04-21 RX ADMIN — ACETAMINOPHEN 975 MG: 325 TABLET ORAL at 08:22

## 2022-04-21 RX ADMIN — ANTACID TABLETS 1000 MG: 500 TABLET, CHEWABLE ORAL at 08:22

## 2022-04-21 ASSESSMENT — ACTIVITIES OF DAILY LIVING (ADL)
ADLS_ACUITY_SCORE: 5

## 2022-04-21 NOTE — DISCHARGE SUMMARY
[facility] Discharge Summary    Naman Barron MRN# [id number]   Age: 72 year old YOB: 1950     Date of Admission:  4/20/2022  Date of Discharge::  4/21/2022  Admitting Physician:  Sandie Fisher MD  Discharge Physician:  Sandie Fisher MD     PCP:  Naman Baumann    Disposition: Patient discharged to home in stable condition.    Admission Diagnosis:  Past Medical History:   Diagnosis Date     Cancer (H), chronic 2007    CML     Gastroesophageal reflux disease      Gout      Obese      Sensorineural hearing loss 1971    [facility], Hearing Aids 2020     Sleep apnea 2007    CPAP       Discharge Diagnosis:  Patient Active Problem List   Diagnosis     Effusion of knee, right     Bacterial infection of knee joint (H)     S/P total thyroidectomy       Discharge medications  Current Discharge Medication List      START taking these medications    Details   calcitRIOL (ROCALTROL) 0.25 MCG capsule Take 1 capsule (0.25 mcg) by mouth 3 times daily  Qty: 90 capsule, Refills: 1    Associated Diagnoses: S/P total thyroidectomy      calcium carbonate (TUMS) 500 MG chewable tablet Take 2 tablets (1,000 mg) by mouth 3 times daily  Qty: 180 tablet, Refills: 2    Associated Diagnoses: S/P total thyroidectomy      levothyroxine (SYNTHROID/LEVOTHROID) 150 MCG tablet Take 1 tablet (150 mcg) by mouth daily  Qty: 30 tablet, Refills: 1    Associated Diagnoses: S/P total thyroidectomy      oxyCODONE (ROXICODONE) 5 MG tablet Take 1 tablet (5 mg) by mouth every 6 hours as needed for pain  Qty: 12 tablet, Refills: 0    Associated Diagnoses: S/P total thyroidectomy         CONTINUE these medications which have NOT CHANGED    Details   imatinib (GLEEVEC) 400 MG tablet Take 400 mg by mouth daily (with dinner)      predniSONE (DELTASONE) 20 MG tablet For gout flare ups last took last week March 2022      vitamin E 400 UNIT capsule Take by mouth daily 2 tabs daily      phosphorus tablet 250 mg  (PHOSPHA 250 NEUTRAL) 250 MG per tablet Phospha 250 Neutral 250 mg tablet      vitamin D3 (CHOLECALCIFEROL) 1000 units (25 mcg) tablet Take by mouth daily 4000 units daily             Follow up, Special Instructions:  After Care     Future Labs/Procedures    Discharge Instructions     Comments:    -Follow up appointment with Dr Fisher on Monday 4/25 to have drain removed.  - Incision site(s): Keep dressing/incision clean/dry/intact for 24 hours. 24 hours after your operation, you may shower. You have surgical glue over your incision, this will wear off in time. No submersion in water (lake/pool/tub) for one month or until approved by your surgeon. There are two small tails of suture on either end of your incision; this will be trimmed at your follow-up appointment if still present.   - If you develop any fever/chills, difficulty breathing, worsening pain, redness, swelling, nausea or vomiting, tingling of your hands or around your lips, or drainage from your wound please call Dr Fisher at [phone]. If unable to reach her, call [phone] and ask to page the Surgical Oncology resident on call.   Ok to take acetaminophen or ibuprofen if your pain is not severe enough for narcotic medication.   -No driving while taking narcotic pain medication.   -It is recommendable to take miralax while on narcotic pain medication to prevent constipation.  -Take calcitriol three times daily, TUMS 2 tablets three times daily, and levothyroxine once daily.  -Please empty and record your drain daily. Strip the drain three times per day and keep it to suction.            Procedures:  4/20 Total thyroidectomy    Consultations:  SPEECH LANGUAGE PATH ADULT IP CONSULT    Brief HPI:  72 year old year old male with history of multi nodular goiter who presented for the above procedure on 4/20.    Hospital Course:  The patient was admitted post-operatively. The patient tolerated the procedure well. The patient recovered well with no  post-operative complications. Prior to discharge pain was controlled with oral pain medication and the patient was able to ambulate and void without difficulty. The patient received appropriate education post operatively. On POD #1 the patient was discharged to home with a drain in place with plans to be removed on 4/25.    Surgical pathology  Pending     Naman Clay MD

## 2022-04-21 NOTE — DISCHARGE SUMMARY
Discharge time/date: 4/21 1018  Walked or Wheelchair: Walked  PIV removed: Yes  Reviewed AVS with patient: Yes  Medication due times added to AVS in EPIC: No, discussed with pharmacy  Verbalized understanding of discharge with teachback: yes  Medications retrieved from pharmacy: yes  Supplies sent home: yes  Belongings from security with patient: no, home medications from med room sent with patient  KLAUS drain teaching completed

## 2022-04-21 NOTE — PLAN OF CARE
Deferral - SLP orders received for swallow evaluation s/p total thyroidectomy. Chart reviewed and discussed with RN and pt. Per RN, no acute changes in swallow function since surgery, pt tolerating regular diet/thin liquids without c/f aspiration. Per pt, no changes in vocal quality, no globus sensation or choking/coughing with meals. SLP will defer evaluation at this time and complete orders, paged MD. Please reconsult SLP with changes in swallow function or communication skills.

## 2022-04-21 NOTE — PLAN OF CARE
Status: POD1 total thyroidectomy with central neck dissection  Vitals: VSS on RA  Neuros: intact. CPAP at night  IV: PIV SL  Labs/Electrolytes: icals slightly low. Continue to monitor  Diet: Regular  Bowel status: BS+ LBM 4/19  : voids spontaneously  Skin: incision, x1 KLAUS with dark red output  Pain: incisional, managed with tylenol  Activity: IND  Plan: likely discharge 1-2 days

## 2022-04-21 NOTE — PHARMACY
"Discussed with provider that patient is observation status at the time of verifying order. Benefit of using home medication, Imatinib, not in original container outweighs risks.  Medication will be placed in an thiago bottle and labeled per the \"Use of patient supplied medications procedure.\"    Julian Servin, Pharm.D, BCPS        "

## 2022-04-21 NOTE — PROVIDER NOTIFICATION
Provider text paged the followin-1: pt has no labs- verifying that you do not want ionized calcium post-op? Thanks! Kunal [id number]

## 2022-04-22 ENCOUNTER — TELEPHONE (OUTPATIENT)
Dept: OTOLARYNGOLOGY | Facility: CLINIC | Age: 72
End: 2022-04-22
Payer: COMMERCIAL

## 2022-04-22 NOTE — TELEPHONE ENCOUNTER
Wife called to schedule drain removal. Patient had surgery with Dr Fisher 4/20/2022. Wants to schedule for Monday. Informed patients wife that Dr. Fisher is only in clinic in  e/o Thursday as they would rather be seen there. Agreeable to come for the appt at 11:00 a.m. on Monday. Knows that output must be less then 30 mL in 24 hrs. Will call if this is not met on Monday.     Kathryn Ornelas on 4/22/2022 at 1:52 PM

## 2022-04-25 ENCOUNTER — ALLIED HEALTH/NURSE VISIT (OUTPATIENT)
Dept: OTOLARYNGOLOGY | Facility: CLINIC | Age: 72
End: 2022-04-25
Payer: COMMERCIAL

## 2022-04-25 DIAGNOSIS — Z48.03 CHANGE OR REMOVAL OF DRAINS: Primary | ICD-10-CM

## 2022-04-25 LAB
PATH REPORT.COMMENTS IMP SPEC: NORMAL
PATH REPORT.COMMENTS IMP SPEC: NORMAL
PATH REPORT.FINAL DX SPEC: NORMAL
PATH REPORT.GROSS SPEC: NORMAL
PATH REPORT.MICROSCOPIC SPEC OTHER STN: NORMAL
PATH REPORT.RELEVANT HX SPEC: NORMAL
PHOTO IMAGE: NORMAL

## 2022-04-25 PROCEDURE — 99207 PR NO CHARGE NURSE ONLY: CPT

## 2022-04-25 PROCEDURE — 88307 TISSUE EXAM BY PATHOLOGIST: CPT | Mod: 26 | Performed by: PATHOLOGY

## 2022-04-26 NOTE — PROGRESS NOTES
Patient in clinic today for KLAUS drain removal. Drain output in last 24 hours was 15 ml, indicating KLAUS removal as it was less than 30 ml in 24 hours. Incision site evaluated and it was clean, dry and intact without evidence of redness, swelling or drainage. KLAUS site was cleansed, suture around tubing was removed, drain bulb was opened, and drain was removed easily. Patient educated on signs and symptoms of infection, site care and provided number to call with further questions or concerns. Patient verbalized understanding and was encouraged to call with any further questions or concerns    Linette Jernigan, RN, BSN

## 2022-04-28 ENCOUNTER — TELEPHONE (OUTPATIENT)
Dept: SURGERY | Facility: CLINIC | Age: 72
End: 2022-04-28

## 2022-04-28 ENCOUNTER — OFFICE VISIT (OUTPATIENT)
Dept: SURGERY | Facility: CLINIC | Age: 72
End: 2022-04-28
Payer: COMMERCIAL

## 2022-04-28 VITALS — HEART RATE: 73 BPM | SYSTOLIC BLOOD PRESSURE: 147 MMHG | DIASTOLIC BLOOD PRESSURE: 82 MMHG | OXYGEN SATURATION: 100 %

## 2022-04-28 DIAGNOSIS — E89.0 S/P TOTAL THYROIDECTOMY: Primary | ICD-10-CM

## 2022-04-28 LAB
CALCIUM SERPL-MCNC: 9.3 MG/DL (ref 8.5–10.1)
PTH-INTACT SERPL-MCNC: 26 PG/ML (ref 18–80)

## 2022-04-28 PROCEDURE — 99024 POSTOP FOLLOW-UP VISIT: CPT | Performed by: SURGERY

## 2022-04-28 PROCEDURE — 36415 COLL VENOUS BLD VENIPUNCTURE: CPT | Performed by: SURGERY

## 2022-04-28 PROCEDURE — 82310 ASSAY OF CALCIUM: CPT | Performed by: SURGERY

## 2022-04-28 PROCEDURE — 83970 ASSAY OF PARATHORMONE: CPT | Performed by: SURGERY

## 2022-04-28 ASSESSMENT — PAIN SCALES - GENERAL: PAINLEVEL: NO PAIN (1)

## 2022-04-28 NOTE — Clinical Note
4/28/2022         RE: Naman Barron  [id number] [address] [place] [id number]        Dear Colleague,    Thank you for referring your patient, Naman Barron, to the [facility]. Please see a copy of my visit note below.    No notes on file    Again, thank you for allowing me to participate in the care of your patient.        Sincerely,        Sandie Fisher MD

## 2022-04-28 NOTE — NURSING NOTE
Naman Barron's chief complaint for this visit includes:  Chief Complaint   Patient presents with     Surgical Followup     1 week S/p Total thyroidectomy and substernal goiter DOS: 04/20/2022     PCP: Naman Baumann    Referring Provider:  No referring provider defined for this encounter.    BP (!) 147/82   Pulse 73   SpO2 100%   No Pain (1)        Allergies   Allergen Reactions     Wasps [Hornets] Difficulty breathing     Throat swells up         Do you need any medication refills at today's visit? NO    Keyla De Luna, MYRNA

## 2022-04-28 NOTE — TELEPHONE ENCOUNTER
Pt called, No answer.  does not identify pt. Left general message for pt to call the [facility] back at [phone]....Kenya Albarran RN                  Comments   Add Notifications  Back to Top      You may decrease your tums to 2 tablets twice a day for 2 weeks then stop. You can discontinue your calcitriol   Written by Sandie Fisher MD on 4/28/2022  3:20 PM CDT

## 2022-04-30 NOTE — OP NOTE
Procedure Date: 04/20/2022    PREOPERATIVE DIAGNOSIS:  Large thyroid goiter with substernal component.    POSTOPERATIVE DIAGNOSIS:  Large thyroid goiter with substernal component.    SURGICAL PROCEDURE PERFORMED:  Total thyroidectomy of greater than 19 cm thyroid substernal goiter.    SURGEON:  Sandie Fisher MD    ASSISTANT:  Naman Trejo MD    ANESTHESIA:  General endotracheal with nerve monitoring endotracheal tube.    COMPLICATIONS:  None.    ESTIMATED BLOOD LOSS:  100 mL    CLINICAL INDICATIONS FOR THE PROCEDURE:  This is a 72-year-old gentleman who has had a long history of this thyroid goiter.  It finally reached the size where he felt that it was causing compressive symptoms when lying down and felt a significant fullness and pressure with swallowing.  Based on this, a CT scan was performed that identified a very large multinodular goiter, larger on the right, with tracheal deviation and substernal component.  It was recommended the patient undergo total thyroidectomy.  The surgical procedure was discussed with the patient including but not limited to the risks of bleeding, infection, injury to the recurrent laryngeal nerve or nerves, potential permanent hypocalcemia, loss of airway.  The patient is aware of this, agreed to proceed with surgery, and consent was obtained.  Site was marked.    DETAILS OF PROCEDURE:  The patient was brought to the operating room in stable condition, placed on the operating table in supine position.  After appropriate general anesthesia was obtained, the patient was prepped and draped in sterile fashion.  Timeout was then performed.  Based on the size of the goiter, a 10 cm incision was made over the anterior neck following a natural skin crease.  The platysma was then divided and subplatysmal planes were then created.  Immediately it was necessary to divide the strap muscles on the right because of the size of the goiter.  Both superficial and deep strap muscles were  divided on the right; strap muscles were divided using a LigaSure.  This permitted better exposure.  We initially carried our dissection cephalad.  The patient had very large vessels associated with this thyroid goiter.  The superior thyroidal vessels and their branches were separately skeletonized, surgically tied and clipped and then divided using a LigaSure.  We then carried our dissection underneath the superior pole of the right lobe of the thyroid and then extended the dissection laterally.  The middle thyroid vein we actually see coming directly off of the internal jugular vein because the thyroid was so big it was compressing the internal jugular vein.  We were able to skeletonized the middle thyroid vein and its branches.  These were separately surgically tied and/or clipped and then divided using the LigaSure.  As we carried our dissection, we tried to rotate the thyroid gland medially, but again it was so large that it was difficult to do so.  We then dissected inferiorly.  The anterior branches of the inferior thyroidal vessels were separately identified, skeletonized, clipped, tied and divided.  This permitted mobilization of the thyroid gland.  We then inspected inferiorly and identified an inferior parathyroid gland.  We dissected this from the undersurface of the thyroid, maintaining its viability.  We also identified the recurrent laryngeal nerve at this location.  As we dissected from inferior to superior we followed the nerve cephalad and then identified the right superior parathyroid gland.  We dissected this from the undersurface of the thyroid, maintaining its viability.  The large, greater than 10 cm, right thyroid lobe was then dissected up and out into the operative bed.  We rotated the thyroid gland medially and then dissected over the anterior portion of the trachea.  Because of the size of the goiter we actually had to divide the thyroid gland at the isthmus and then sent this as right  lobe of the thyroid separately.  The left lobe of the thyroid gland was dissected in a similar manner.  However, it was not necessary to divide completely the superficial and deep strap muscles on the left.  We just divided about one-fourth of the medial aspect of the superficial and deep strap muscles on the left.  Again, the left lobe of the thyroid gland was dissected in very similar manner.  Once the thyroid gland was then completely removed, the area was irrigated.  Valsalva maneuver was performed x 2.  What minimal amount of bleeding noted which was easily controlled with bipolar cautery.  We confirmed that both right and left recurrent laryngeal nerves were intact visibly and confirmed to be intact with nerve stimulation.  All 4 parathyroid glands appeared to be intact and viable at the conclusion of the case.  The strap muscles on the right that were completely divided were reapproximated using a 3-0 Vicryl horizontal mattress suture.  Surgiflo and Fibrillar were then placed in the operative bed.  A drain was then also placed in the operative bed and exited inferolateral to the incision on the right.  The drain was secured in place with a 2-0 nylon suture.  The strap muscles were then approximated at the midline using a 3-0 chromic interrupted suture.  The platysma was approximated using 3-0 chromic interrupted suture.  The skin incision was approximated using 5-0 Monocryl running subcuticular suture.  The wounds were dressed with Dermabond.  The drain was hooked to a bulb suction and the drain site was dressed with bacitracin.  The patient was then extubated and returned to the recovery room in stable condition.  I was present for the entire surgical procedure.      Sandie Fisher MD        D: 2022   T: 2022   MT: [id number]    Name:     TAY LOZANO  MRN:      [id number]        Account:        [id number]   :      1950           Procedure Date: 2022     Document:  [id number]

## 2022-09-07 NOTE — PROGRESS NOTES
1 week s/p total thyroidectomy for large thyroid goiter.    Since the surgery, the patient denies any problems with voice quality, inspiration or swallowing.     PE:  Wound is healing well. Left Dermabond in place  No evidence of hematoma, seroma or infection    Pathology reviewed with the patient    Plan  Discussed wound management and massage-to start at 2 weeks post op  Check TFT's at 6 weeks post op  Continue with Calicum until 2 weeks post op  Follow up with endocrinology.    Sandie Fisher MD

## (undated) DEVICE — SOL NACL 0.9% IRRIG 1000ML BOTTLE 2F7124

## (undated) DEVICE — SU SILK 3-0 TIE 12X30" A304H

## (undated) DEVICE — ESU GROUND PAD ADULT W/CORD E7507

## (undated) DEVICE — SU VICRYL 3-0 SH 27" UND J416H

## (undated) DEVICE — SU CHROMIC 3-0 SH 27" G122H

## (undated) DEVICE — TUBE ENDOTRACHEAL NIM TRIVANTAGE 7.0MM 8229707

## (undated) DEVICE — DRSG TEGADERM 2 3/8X2 3/4" 1624W

## (undated) DEVICE — NIM PROBE PRASS INCREMENTING TIP 8225825

## (undated) DEVICE — SU MONOCRYL 5-0 P-3 18" UND Y493G

## (undated) DEVICE — LINEN TOWEL PACK X6 WHITE 5487

## (undated) DEVICE — ESU LIGASURE DISSECTOR EXACT LF2019

## (undated) DEVICE — SU SILK 3-0 SH CR 8X18" C013D

## (undated) DEVICE — SUCTION MANIFOLD NEPTUNE 2 SYS 4 PORT 0702-020-000

## (undated) DEVICE — SPONGE LAP 18X18" X8435

## (undated) DEVICE — CLIP HORIZON SM RED WIDE SLOT 001201

## (undated) DEVICE — PREP CHLORAPREP 26ML TINTED HI-LITE ORANGE 930815

## (undated) DEVICE — SURGICEL FIBRILLAR HEMOSTAT 2"X4" JJ1962

## (undated) DEVICE — SU ETHILON 2-0 FS 18" 664H

## (undated) DEVICE — RX SURGIFLO HEMOSTATIC MATRIX W/THROMBIN 8ML 2994

## (undated) DEVICE — ESU PENCIL SMOKE EVAC W/ROCKER SWITCH 0703-047-000

## (undated) DEVICE — PACK NEURO MINOR UMMC SNE32MNMU4

## (undated) DEVICE — SU SILK 2-0 TIE 12X30" A305H

## (undated) DEVICE — SPONGE KITTNER 30-101

## (undated) DEVICE — DRAIN JACKSON PRATT RESERVOIR 100ML SU130-1305

## (undated) DEVICE — CLIP HORIZON MED BLUE 002200

## (undated) DEVICE — DRAIN JACKSON PRATT CHANNEL 15FR ROUND HUBLESS SIL JP-2228

## (undated) DEVICE — ESU ELEC BLADE 2.75" COATED/INSULATED E1455

## (undated) DEVICE — LINEN TOWEL PACK X5 5464

## (undated) DEVICE — TABLESPOON METAL STERILE 17508/24

## (undated) RX ORDER — FENTANYL CITRATE 50 UG/ML
INJECTION, SOLUTION INTRAMUSCULAR; INTRAVENOUS
Status: DISPENSED
Start: 2022-04-20

## (undated) RX ORDER — LIDOCAINE HYDROCHLORIDE 20 MG/ML
INJECTION, SOLUTION EPIDURAL; INFILTRATION; INTRACAUDAL; PERINEURAL
Status: DISPENSED
Start: 2022-04-20

## (undated) RX ORDER — DEXAMETHASONE SODIUM PHOSPHATE 4 MG/ML
INJECTION, SOLUTION INTRA-ARTICULAR; INTRALESIONAL; INTRAMUSCULAR; INTRAVENOUS; SOFT TISSUE
Status: DISPENSED
Start: 2022-04-20

## (undated) RX ORDER — ONDANSETRON 2 MG/ML
INJECTION INTRAMUSCULAR; INTRAVENOUS
Status: DISPENSED
Start: 2022-04-20

## (undated) RX ORDER — HYDROMORPHONE HCL IN WATER/PF 6 MG/30 ML
PATIENT CONTROLLED ANALGESIA SYRINGE INTRAVENOUS
Status: DISPENSED
Start: 2022-04-20

## (undated) RX ORDER — HYDROMORPHONE HYDROCHLORIDE 1 MG/ML
INJECTION, SOLUTION INTRAMUSCULAR; INTRAVENOUS; SUBCUTANEOUS
Status: DISPENSED
Start: 2022-04-20

## (undated) RX ORDER — PROPOFOL 10 MG/ML
INJECTION, EMULSION INTRAVENOUS
Status: DISPENSED
Start: 2022-04-20

## (undated) RX ORDER — ACETAMINOPHEN 325 MG/1
TABLET ORAL
Status: DISPENSED
Start: 2022-04-20